# Patient Record
Sex: FEMALE | Race: BLACK OR AFRICAN AMERICAN | NOT HISPANIC OR LATINO | Employment: FULL TIME | URBAN - METROPOLITAN AREA
[De-identification: names, ages, dates, MRNs, and addresses within clinical notes are randomized per-mention and may not be internally consistent; named-entity substitution may affect disease eponyms.]

---

## 2024-03-27 ENCOUNTER — HOSPITAL ENCOUNTER (OUTPATIENT)
Dept: RADIOLOGY | Facility: EXTERNAL LOCATION | Age: 24
Discharge: HOME | End: 2024-03-27

## 2024-03-27 DIAGNOSIS — R06.02 SOB (SHORTNESS OF BREATH): ICD-10-CM

## 2024-06-25 ENCOUNTER — APPOINTMENT (OUTPATIENT)
Dept: PRIMARY CARE | Facility: CLINIC | Age: 24
End: 2024-06-25
Payer: COMMERCIAL

## 2024-06-25 ENCOUNTER — LAB (OUTPATIENT)
Dept: LAB | Facility: LAB | Age: 24
End: 2024-06-25
Payer: COMMERCIAL

## 2024-06-25 ENCOUNTER — TELEPHONE (OUTPATIENT)
Dept: PRIMARY CARE | Facility: CLINIC | Age: 24
End: 2024-06-25

## 2024-06-25 VITALS
TEMPERATURE: 98.1 F | HEIGHT: 64 IN | SYSTOLIC BLOOD PRESSURE: 102 MMHG | OXYGEN SATURATION: 98 % | DIASTOLIC BLOOD PRESSURE: 70 MMHG | HEART RATE: 67 BPM | RESPIRATION RATE: 16 BRPM | BODY MASS INDEX: 24.75 KG/M2 | WEIGHT: 145 LBS

## 2024-06-25 DIAGNOSIS — Z12.4 CERVICAL CANCER SCREENING: ICD-10-CM

## 2024-06-25 DIAGNOSIS — J45.20 MILD INTERMITTENT ASTHMA, UNSPECIFIED WHETHER COMPLICATED (HHS-HCC): ICD-10-CM

## 2024-06-25 DIAGNOSIS — J18.9 COMMUNITY ACQUIRED PNEUMONIA, UNSPECIFIED LATERALITY: ICD-10-CM

## 2024-06-25 DIAGNOSIS — E87.5 HYPERKALEMIA: Primary | ICD-10-CM

## 2024-06-25 DIAGNOSIS — N92.6 IRREGULAR MENSES: ICD-10-CM

## 2024-06-25 DIAGNOSIS — Z00.00 HEALTHCARE MAINTENANCE: Primary | ICD-10-CM

## 2024-06-25 DIAGNOSIS — Z00.00 HEALTHCARE MAINTENANCE: ICD-10-CM

## 2024-06-25 LAB
ALBUMIN SERPL BCP-MCNC: 4.3 G/DL (ref 3.4–5)
ALP SERPL-CCNC: 46 U/L (ref 33–110)
ALT SERPL W P-5'-P-CCNC: 20 U/L (ref 7–45)
ANION GAP SERPL CALC-SCNC: 12 MMOL/L (ref 10–20)
AST SERPL W P-5'-P-CCNC: 15 U/L (ref 9–39)
BASOPHILS # BLD AUTO: 0.04 X10*3/UL (ref 0–0.1)
BASOPHILS NFR BLD AUTO: 0.6 %
BILIRUB SERPL-MCNC: 0.5 MG/DL (ref 0–1.2)
BUN SERPL-MCNC: 11 MG/DL (ref 6–23)
CALCIUM SERPL-MCNC: 9.5 MG/DL (ref 8.6–10.3)
CHLORIDE SERPL-SCNC: 107 MMOL/L (ref 98–107)
CO2 SERPL-SCNC: 28 MMOL/L (ref 21–32)
CREAT SERPL-MCNC: 0.81 MG/DL (ref 0.5–1.05)
EGFRCR SERPLBLD CKD-EPI 2021: >90 ML/MIN/1.73M*2
EOSINOPHIL # BLD AUTO: 0.48 X10*3/UL (ref 0–0.7)
EOSINOPHIL NFR BLD AUTO: 7.5 %
ERYTHROCYTE [DISTWIDTH] IN BLOOD BY AUTOMATED COUNT: 13.7 % (ref 11.5–14.5)
GLUCOSE SERPL-MCNC: 83 MG/DL (ref 74–99)
HCT VFR BLD AUTO: 34.8 % (ref 36–46)
HGB BLD-MCNC: 11.6 G/DL (ref 12–16)
IMM GRANULOCYTES # BLD AUTO: 0.01 X10*3/UL (ref 0–0.7)
IMM GRANULOCYTES NFR BLD AUTO: 0.2 % (ref 0–0.9)
LYMPHOCYTES # BLD AUTO: 1.9 X10*3/UL (ref 1.2–4.8)
LYMPHOCYTES NFR BLD AUTO: 29.5 %
MCH RBC QN AUTO: 29 PG (ref 26–34)
MCHC RBC AUTO-ENTMCNC: 33.3 G/DL (ref 32–36)
MCV RBC AUTO: 87 FL (ref 80–100)
MONOCYTES # BLD AUTO: 0.51 X10*3/UL (ref 0.1–1)
MONOCYTES NFR BLD AUTO: 7.9 %
NEUTROPHILS # BLD AUTO: 3.5 X10*3/UL (ref 1.2–7.7)
NEUTROPHILS NFR BLD AUTO: 54.3 %
NRBC BLD-RTO: 0 /100 WBCS (ref 0–0)
PLATELET # BLD AUTO: 379 X10*3/UL (ref 150–450)
POC APPEARANCE, URINE: CLEAR
POC BILIRUBIN, URINE: NEGATIVE
POC BLOOD, URINE: NEGATIVE
POC COLOR, URINE: YELLOW
POC GLUCOSE, URINE: NEGATIVE MG/DL
POC KETONES, URINE: NEGATIVE MG/DL
POC LEUKOCYTES, URINE: NEGATIVE
POC NITRITE,URINE: NEGATIVE
POC PH, URINE: 6 PH
POC PROTEIN, URINE: NEGATIVE MG/DL
POC SPECIFIC GRAVITY, URINE: >=1.03
POC UROBILINOGEN, URINE: 0.2 EU/DL
POTASSIUM SERPL-SCNC: 5.7 MMOL/L (ref 3.5–5.3)
PROT SERPL-MCNC: 7 G/DL (ref 6.4–8.2)
RBC # BLD AUTO: 4 X10*6/UL (ref 4–5.2)
SODIUM SERPL-SCNC: 141 MMOL/L (ref 136–145)
T4 FREE SERPL-MCNC: 0.81 NG/DL (ref 0.61–1.12)
TSH SERPL-ACNC: 1.45 MIU/L (ref 0.44–3.98)
WBC # BLD AUTO: 6.4 X10*3/UL (ref 4.4–11.3)

## 2024-06-25 PROCEDURE — 90471 IMMUNIZATION ADMIN: CPT | Performed by: FAMILY MEDICINE

## 2024-06-25 PROCEDURE — 81002 URINALYSIS NONAUTO W/O SCOPE: CPT | Performed by: FAMILY MEDICINE

## 2024-06-25 PROCEDURE — 80053 COMPREHEN METABOLIC PANEL: CPT

## 2024-06-25 PROCEDURE — 84443 ASSAY THYROID STIM HORMONE: CPT

## 2024-06-25 PROCEDURE — 99395 PREV VISIT EST AGE 18-39: CPT | Performed by: FAMILY MEDICINE

## 2024-06-25 PROCEDURE — 85025 COMPLETE CBC W/AUTO DIFF WBC: CPT

## 2024-06-25 PROCEDURE — 90715 TDAP VACCINE 7 YRS/> IM: CPT | Performed by: FAMILY MEDICINE

## 2024-06-25 PROCEDURE — 36415 COLL VENOUS BLD VENIPUNCTURE: CPT

## 2024-06-25 PROCEDURE — 99214 OFFICE O/P EST MOD 30 MIN: CPT | Performed by: FAMILY MEDICINE

## 2024-06-25 PROCEDURE — 84439 ASSAY OF FREE THYROXINE: CPT

## 2024-06-25 RX ORDER — ALBUTEROL SULFATE 90 UG/1
AEROSOL, METERED RESPIRATORY (INHALATION)
COMMUNITY

## 2024-06-25 RX ORDER — ETONOGESTREL 68 MG/1
1 IMPLANT SUBCUTANEOUS ONCE
COMMUNITY

## 2024-06-25 RX ORDER — ALBUTEROL SULFATE 0.83 MG/ML
SOLUTION RESPIRATORY (INHALATION)
COMMUNITY

## 2024-06-25 ASSESSMENT — PATIENT HEALTH QUESTIONNAIRE - PHQ9
1. LITTLE INTEREST OR PLEASURE IN DOING THINGS: NOT AT ALL
SUM OF ALL RESPONSES TO PHQ9 QUESTIONS 1 AND 2: 0
2. FEELING DOWN, DEPRESSED OR HOPELESS: NOT AT ALL

## 2024-06-25 NOTE — PROGRESS NOTES
"Subjective   Patient ID: Forest Campos is a 24 y.o. female who presents for Annual Exam (Notes irregular menstrual cycles. LMP last week of may and then again 6/15. ).    She had pneumonia and was told to follow up here.  She was in the ER 6/1.  She was given antibiotics and steroids.  She has a history of asthma.  She just uses albuterol and uses that usually about once a day.     She started taking ozempic in November but stopped it in June.         Dentist and Eye Doctor appointments: Due for dentist  Immunizations:due for Tdap  Diet:eats healthy was taking ozempic but off now  Exercise: Tries to go to the gym one day a week  Supplements: None  Menstrual Cycles: has nexaplanon so irregular menses  Sexually Active: yes, male, no std concerns  Pregnancy History:G0  Cancer Screening:    Cervical: GYN - due in 2025   Breast: N/A   Colon: N/A   Skin:wears sunscreen   DEXA:N/A          Review of Systems    Objective   /70   Pulse 67   Temp 36.7 °C (98.1 °F)   Resp 16   Ht 1.626 m (5' 4\")   Wt 65.8 kg (145 lb)   LMP 06/15/2024 Comment: last pap 9/2022 [facility] gyn  SpO2 98%   BMI 24.89 kg/m²     Physical Exam  Constitutional:       General: She is not in acute distress.     Appearance: She is well-developed. She is not diaphoretic.   HENT:      Head: Normocephalic and atraumatic.      Right Ear: Tympanic membrane normal.      Left Ear: Tympanic membrane normal.      Nose: Nose normal.      Mouth/Throat:      Mouth: Mucous membranes are moist.   Eyes:      General: No scleral icterus.     Pupils: Pupils are equal, round, and reactive to light.   Neck:      Thyroid: No thyromegaly.      Vascular: No JVD.   Cardiovascular:      Rate and Rhythm: Normal rate and regular rhythm.      Heart sounds: Normal heart sounds. No murmur heard.     No friction rub. No gallop.   Pulmonary:      Effort: Pulmonary effort is normal. No respiratory distress.      Breath sounds: Normal breath sounds. No wheezing or rales. "   Chest:      Chest wall: No tenderness.   Breasts:     Right: Normal.      Left: Normal.   Abdominal:      General: Bowel sounds are normal. There is no distension.      Palpations: Abdomen is soft. There is no mass.      Tenderness: There is no abdominal tenderness. There is no rebound.   Musculoskeletal:         General: Normal range of motion.      Cervical back: Normal range of motion and neck supple.   Lymphadenopathy:      Cervical: No cervical adenopathy.   Skin:     General: Skin is warm and dry.   Neurological:      General: No focal deficit present.      Mental Status: She is alert and oriented to person, place, and time.      Deep Tendon Reflexes: Reflexes normal.   Psychiatric:         Mood and Affect: Mood normal.         Behavior: Behavior normal.         Assessment/Plan   Diagnoses and all orders for this visit:  Healthcare maintenance  -     POCT UA (nonautomated) manually resulted  -     CBC and Auto Differential; Future  -     Comprehensive Metabolic Panel; Future  Mild intermittent asthma, unspecified whether complicated ([facility])  -     Complete Pulmonary Function Test Pre/Post Bronchodialator (Spirometry Pre/Post/DLCO/Lung Volumes); Future  Community acquired pneumonia, unspecified laterality  Cervical cancer screening  Comments:  2022 - wnl, gyn @ [facility]  Irregular menses  -     Thyroid Stimulating Hormone; Future  -     Thyroxine, Free; Future  Other orders  -     Tdap vaccine, age 7 years and older  (BOOSTRIX)

## 2024-06-25 NOTE — PATIENT INSTRUCTIONS
Today we performed your Annual Physical Exam.  Your preventative health care, labs and vaccinations have been reviewed and are up to date.      You were given a Tdap vaccine today and this is good for 10 years.    For your irregular periods I have advised that you get labs and follow up with your GYN for your nexplanon    For your recent pneumonia I have ordered a new CXR    For your history of intermittent asthma we had a long discussion of not smoking marijuana and we will also do new PFT's     Follow up when results received.    Follow up in 1 year for your Complete Physical Exam

## 2024-06-26 ENCOUNTER — TELEPHONE (OUTPATIENT)
Dept: PRIMARY CARE | Facility: CLINIC | Age: 24
End: 2024-06-26
Payer: COMMERCIAL

## 2024-06-26 NOTE — RESULT ENCOUNTER NOTE
Labs show  potassium is slightly elevated. This can sometimes happen accidentally depending on how the tube of blood is processed.  So let’s recheck this in two weeks please I have ordered it and you can stop in at the lab at anytime.

## 2024-07-22 ENCOUNTER — LAB REQUISITION (OUTPATIENT)
Dept: LAB | Facility: HOSPITAL | Age: 24
End: 2024-07-22
Payer: COMMERCIAL

## 2024-07-22 DIAGNOSIS — R30.0 DYSURIA: ICD-10-CM

## 2024-07-22 DIAGNOSIS — R35.0 FREQUENCY OF MICTURITION: ICD-10-CM

## 2024-07-22 PROCEDURE — 87661 TRICHOMONAS VAGINALIS AMPLIF: CPT

## 2024-07-22 PROCEDURE — 87086 URINE CULTURE/COLONY COUNT: CPT

## 2024-07-22 PROCEDURE — 87491 CHLMYD TRACH DNA AMP PROBE: CPT

## 2024-07-22 PROCEDURE — 87591 N.GONORRHOEAE DNA AMP PROB: CPT

## 2024-07-22 PROCEDURE — 87186 SC STD MICRODIL/AGAR DIL: CPT

## 2024-07-23 LAB
C TRACH RRNA SPEC QL NAA+PROBE: NEGATIVE
N GONORRHOEA DNA SPEC QL PROBE+SIG AMP: NEGATIVE
T VAGINALIS RRNA SPEC QL NAA+PROBE: NEGATIVE

## 2024-07-25 LAB — BACTERIA UR CULT: ABNORMAL

## 2024-08-08 ENCOUNTER — APPOINTMENT (OUTPATIENT)
Dept: RESPIRATORY THERAPY | Facility: HOSPITAL | Age: 24
End: 2024-08-08
Payer: COMMERCIAL

## 2025-01-15 ENCOUNTER — HOSPITAL ENCOUNTER (OUTPATIENT)
Dept: RESPIRATORY THERAPY | Facility: HOSPITAL | Age: 25
Discharge: HOME | End: 2025-01-15
Payer: COMMERCIAL

## 2025-01-15 DIAGNOSIS — J45.20 MILD INTERMITTENT ASTHMA, UNSPECIFIED WHETHER COMPLICATED (HHS-HCC): ICD-10-CM

## 2025-01-15 LAB
MGC ASCENT PFT - FEV1 - PRE: 3.28
MGC ASCENT PFT - FEV1 - PREDICTED: 3.19
MGC ASCENT PFT - FVC - PRE: 3.8
MGC ASCENT PFT - FVC - PREDICTED: 3.69

## 2025-01-15 PROCEDURE — 94726 PLETHYSMOGRAPHY LUNG VOLUMES: CPT

## 2025-01-20 ENCOUNTER — APPOINTMENT (OUTPATIENT)
Facility: CLINIC | Age: 25
End: 2025-01-20
Payer: COMMERCIAL

## 2025-01-20 VITALS
WEIGHT: 138 LBS | SYSTOLIC BLOOD PRESSURE: 104 MMHG | HEART RATE: 75 BPM | OXYGEN SATURATION: 97 % | TEMPERATURE: 98.5 F | RESPIRATION RATE: 16 BRPM | DIASTOLIC BLOOD PRESSURE: 72 MMHG | BODY MASS INDEX: 23.69 KG/M2

## 2025-01-20 DIAGNOSIS — R06.2 WHEEZE: ICD-10-CM

## 2025-01-20 DIAGNOSIS — J45.909 REACTIVE AIRWAY DISEASE WITHOUT COMPLICATION, UNSPECIFIED ASTHMA SEVERITY, UNSPECIFIED WHETHER PERSISTENT (HHS-HCC): Primary | ICD-10-CM

## 2025-01-20 PROCEDURE — 99214 OFFICE O/P EST MOD 30 MIN: CPT | Performed by: FAMILY MEDICINE

## 2025-01-20 PROCEDURE — 1036F TOBACCO NON-USER: CPT | Performed by: FAMILY MEDICINE

## 2025-01-20 RX ORDER — FLUTICASONE PROPIONATE 110 UG/1
1 AEROSOL, METERED RESPIRATORY (INHALATION)
Qty: 12 G | Refills: 3 | Status: SHIPPED | OUTPATIENT
Start: 2025-01-20 | End: 2025-01-24 | Stop reason: ALTCHOICE

## 2025-01-20 RX ORDER — ALBUTEROL SULFATE 90 UG/1
2 INHALANT RESPIRATORY (INHALATION) EVERY 4 HOURS PRN
Qty: 18 G | Refills: 3 | Status: SHIPPED | OUTPATIENT
Start: 2025-01-20

## 2025-01-20 NOTE — PROGRESS NOTES
Current Outpatient Medications   Medication Sig Dispense Refill    albuterol 2.5 mg /3 mL (0.083 %) nebulizer solution USE 3 ML VIA NEBULIZER EVERY 6 HOURS AS NEEDED FOR WHEEZING OR SHORTNESS OF BREATH      etonogestrel-eluting contraceptive (Nexplanon) 68 mg implant implant 1 each by subdermal route 1 time.      albuterol 90 mcg/actuation inhaler Inhale 2 puffs every 4 hours if needed for wheezing or other (30 minutes prior to exercise). 18 g 3    fluticasone (Flovent) 110 mcg/actuation inhaler Inhale 1 puff 2 times a day. Rinse mouth with water after use to reduce aftertaste and incidence of candidiasis. Do not swallow. 12 g 3     No current facility-administered medications for this visit.   Subjective   Patient ID: Forest Campos is a 24 y.o. female who presents for Results (Discuss PFT).    She was having trouble breathing she states that mostly it was when she was smoking. She has been trying not to smoke this month.  This is referral to marijuana.  Her resolution has been not smoking.  After she works out she has trouble breathing, also  in the mornings and when it's cold.  She had pneumonia last spring and after that it made it worse breathing. Her inhaler does help her but she has to hit it multiple times throughout the day.  She just wokred out and took the inhaler prior to exercise and now feels like she needs it again.           Review of Systems    Objective   /72   Pulse 75   Temp 36.9 °C (98.5 °F)   Resp 16   Wt 62.6 kg (138 lb)   SpO2 97%   BMI 23.69 kg/m²     Physical Exam  Constitutional:       Appearance: Normal appearance.   HENT:      Head: Normocephalic.   Eyes:      Pupils: Pupils are equal, round, and reactive to light.   Cardiovascular:      Rate and Rhythm: Normal rate and regular rhythm.   Pulmonary:      Effort: Pulmonary effort is normal.      Breath sounds: Wheezing (scattered throughout) present.   Musculoskeletal:      Cervical back: Normal range of motion.   Skin:      General: Skin is warm.   Neurological:      General: No focal deficit present.      Mental Status: She is alert and oriented to person, place, and time.   Psychiatric:         Mood and Affect: Mood normal.         Assessment/Plan   Diagnoses and all orders for this visit:  Reactive airway disease without complication, unspecified asthma severity, unspecified whether persistent ([facility])  -     albuterol 90 mcg/actuation inhaler; Inhale 2 puffs every 4 hours if needed for wheezing or other (30 minutes prior to exercise).  -     Referral to Pulmonology; Future  -     Aerochamber Spacer Device  -     fluticasone (Flovent) 110 mcg/actuation inhaler; Inhale 1 puff 2 times a day. Rinse mouth with water after use to reduce aftertaste and incidence of candidiasis. Do not swallow.  Wheeze

## 2025-01-20 NOTE — PATIENT INSTRUCTIONS
Today we have addressed your reactive airway disease.  Your PFT's were normal but did identify air trapping. You are wheezing on your exam.  I do want to see if you would benefit from inhaled steroids and so we will start you on flovent and use the albuterol prn.  I also want you to have a pulmonology consult in order to determine if we are on the best plan for you given your normal PFT's.  Lastly please use a spacer divice with your albuterol.

## 2025-01-21 ENCOUNTER — APPOINTMENT (OUTPATIENT)
Facility: CLINIC | Age: 25
End: 2025-01-21
Payer: COMMERCIAL

## 2025-01-24 DIAGNOSIS — J45.909 REACTIVE AIRWAY DISEASE WITHOUT COMPLICATION, UNSPECIFIED ASTHMA SEVERITY, UNSPECIFIED WHETHER PERSISTENT (HHS-HCC): Primary | ICD-10-CM

## 2025-01-24 RX ORDER — FLUTICASONE FUROATE 100 UG/1
1 POWDER RESPIRATORY (INHALATION) DAILY
Qty: 1 EACH | Refills: 11 | Status: SHIPPED | OUTPATIENT
Start: 2025-01-24 | End: 2026-01-24

## 2025-01-24 NOTE — PROGRESS NOTES
Current Outpatient Medications   Medication Sig Dispense Refill    albuterol 2.5 mg /3 mL (0.083 %) nebulizer solution USE 3 ML VIA NEBULIZER EVERY 6 HOURS AS NEEDED FOR WHEEZING OR SHORTNESS OF BREATH      albuterol 90 mcg/actuation inhaler Inhale 2 puffs every 4 hours if needed for wheezing or other (30 minutes prior to exercise). 18 g 3    etonogestrel-eluting contraceptive (Nexplanon) 68 mg implant implant 1 each by subdermal route 1 time.      fluticasone (Flovent) 110 mcg/actuation inhaler Inhale 1 puff 2 times a day. Rinse mouth with water after use to reduce aftertaste and incidence of candidiasis. Do not swallow. 12 g 3     No current facility-administered medications for this visit.

## 2025-03-20 ENCOUNTER — OFFICE VISIT (OUTPATIENT)
Dept: PULMONOLOGY | Facility: HOSPITAL | Age: 25
End: 2025-03-20
Payer: COMMERCIAL

## 2025-03-20 VITALS
DIASTOLIC BLOOD PRESSURE: 75 MMHG | WEIGHT: 143.6 LBS | OXYGEN SATURATION: 98 % | TEMPERATURE: 97 F | SYSTOLIC BLOOD PRESSURE: 114 MMHG | BODY MASS INDEX: 24.65 KG/M2 | HEART RATE: 69 BPM

## 2025-03-20 DIAGNOSIS — J45.40 MODERATE PERSISTENT ASTHMA WITHOUT COMPLICATION (HHS-HCC): Primary | ICD-10-CM

## 2025-03-20 PROCEDURE — 1036F TOBACCO NON-USER: CPT | Performed by: NURSE PRACTITIONER

## 2025-03-20 PROCEDURE — 99215 OFFICE O/P EST HI 40 MIN: CPT | Performed by: NURSE PRACTITIONER

## 2025-03-20 PROCEDURE — 99205 OFFICE O/P NEW HI 60 MIN: CPT | Performed by: NURSE PRACTITIONER

## 2025-03-20 RX ORDER — BUDESONIDE AND FORMOTEROL FUMARATE DIHYDRATE 80; 4.5 UG/1; UG/1
2 AEROSOL RESPIRATORY (INHALATION)
Qty: 10.2 G | Refills: 5 | Status: SHIPPED | OUTPATIENT
Start: 2025-03-20

## 2025-03-20 ASSESSMENT — ENCOUNTER SYMPTOMS
BRUISES/BLEEDS EASILY: 0
VOMITING: 0
BACK PAIN: 0
SHORTNESS OF BREATH: 1
FATIGUE: 0
ABDOMINAL PAIN: 0
PALPITATIONS: 0
HEADACHES: 0
AGITATION: 0
MYALGIAS: 0
APPETITE CHANGE: 0
TROUBLE SWALLOWING: 0
WHEEZING: 1
UNEXPECTED WEIGHT CHANGE: 0
ARTHRALGIAS: 0
CHILLS: 0
ACTIVITY CHANGE: 0
SORE THROAT: 0
SLEEP DISTURBANCE: 0
EYE REDNESS: 0
NAUSEA: 0
SINUS PRESSURE: 0
SINUS PAIN: 0
COUGH: 1
FEVER: 0
TREMORS: 0
VOICE CHANGE: 0
DIZZINESS: 0
JOINT SWELLING: 0
RHINORRHEA: 0
EYE ITCHING: 0
DIARRHEA: 0
NERVOUS/ANXIOUS: 1
STRIDOR: 0
WEAKNESS: 0
ROS GI COMMENTS: DENIES ACID REFLUX
CHEST TIGHTNESS: 1

## 2025-03-20 ASSESSMENT — LIFESTYLE VARIABLES
SKIP TO QUESTIONS 9-10: 1
HOW OFTEN DO YOU HAVE A DRINK CONTAINING ALCOHOL: NEVER
AUDIT-C TOTAL SCORE: 0
HOW MANY STANDARD DRINKS CONTAINING ALCOHOL DO YOU HAVE ON A TYPICAL DAY: PATIENT DOES NOT DRINK
HOW OFTEN DO YOU HAVE SIX OR MORE DRINKS ON ONE OCCASION: NEVER

## 2025-03-20 ASSESSMENT — PAIN SCALES - GENERAL: PAINLEVEL_OUTOF10: 0-NO PAIN

## 2025-03-20 NOTE — PATIENT INSTRUCTIONS
"Today we discussed your pulmonary history, symptoms and plan moving forward.    -Start Symbicort 80 (budesonide + formoterol); 2 inhalations twice a day. Rinse mouth after use to avoid thrush. This is your long acting daily maintenance inhaler. Please contact my nurse if you have any difficulty getting this prescription.  -Continue Albuterol Inhaler; 2 puffs every 4-6 hours as needed for shortness of breath. You can also take this 10-15 minutes prior to exertional activity to help \"prime\" your lungs.  -Please monitor frequency of need of the albuterol inhaler.  If you find yourself needing to use this most days of the week/multiple times a day; please give one of my nurses to call and we will look to get you started on a daily maintenance inhaler as we discussed today.  -If you feel like you are starting to have an exacerbation of your symptoms (increased cough with purulent mucus, shortness of breath, overall not feeling well, etc); please contact the pulmonary nurse and I will be more than happy to call you and a course of prednisone/antibiotics if indicated.    Thank you for visiting the pulmonary clinic today! It was a pleasure to participate in your care.  Please return to clinic 3 months or sooner if needed.    Osvaldo Taylor, CNP  My Office Number: [phone]   CT Scheduling: [phone]  PFT (Pulmonary Function Test) Scheduling: [phone]  Follow Up Visit Scheduling: [phone]  My Nurse: Ana Manning RN & Sue Galvez RN    To reach the nurse, Ana Manning RN, please call [phone]). If unable to reach Ana, please contact Sue Galvez RN at ([phone]). Both nurses have secure voicemail's if needing to leave a message.    **For urgent needs such as medication issues/refills, active sick symptoms or medical concerns please call the office directly and speak to the pulmonary nurse. For nonurgent messages please use [employer]. Thank you.**    "

## 2025-03-20 NOTE — PROGRESS NOTES
Patient: Forest Campos    MRN: [id number]  : 2000 -- AGE: 24 y.o.    Provider: NURA Nichole     Location [facility]   Service Date: 3/20/2025       Department of Medicine  Division of Pulmonary, Critical Care, and Sleep Medicine       [facility]  New Visit Note    HISTORY OF PRESENT ILLNESS     The patient's referring provider is: Mattie Heath DO    PCP: Dr. Mattie Heath     HISTORY OF PRESENT ILLNESS   Forest Campos is a 24 y.o. female who presents to [facility] for an evaluation with concerns of No chief complaint on file.. I have independently interviewed and examined the patient in the office and reviewed available records.    Current History  Patient presents to pulmonary clinic today for new patient establishment; concern of asthma; referred by the PCP.  Patient most recently completed PFT study on 1/15/2025 which did not show any obstruction or restriction, no bronchodilator was performed there was mild to moderate air trapping on lung volumes and a normal diffusion capacity.  Upon chart review it appears that her eosinophils have been historically elevated; most recently on 2024; 480.  It appears that the primary care prescribed her Arnuity 100 on 2025.    She has never been seen by a pulmonologist previously. She had pneumonia a few days after she was born and has had pneumonias a few times subsequently throughout her life. She was diagnosed with asthma by her pediatrician as a young child. She was most recently diagnosed with pneumonia twice last spring in which for one of the episodes she was evaluated in the ED and discharged home with antibiotics, steroids, and PCP follow-up. She had COVID-19 two years ago.    She reports her asthma symptoms have been worse over the past year. She states that previously, aside from the few episodes of pneumonia, her asthma was  more controlled. She tends to have symptom flares at least once daily. She reports wheezing is the most prominent symptom she has. The wheezing worsens with exposure to cleaning products, cold air, smoke, and exercise. She reports shortness of breath and chest tightness on exertion and the sensation of feeling like she is breathing through a straw. She reports she occasionally coughs up yellow/green mucus. She states sometimes she feels the mucus originates from her sinuses and she has to blow her nose, but she denies any regular sinus drainage issues. She also sometimes notices coughing at night that wakes her up. She denies shortness of breath at rest, allergy issues, or GERD.    She has an albuterol HFA and nebulized albuterol. She reports the albuterol inhaler resolves symptoms after she takes 4 puffs. She uses her albuterol HFA about 2-3 times daily; she sometimes uses it first thing in the morning, and then if she is walking longer distances or exercising. She tends to only use the nebulized albuterol when she is sick. She never picked up the Arnuity inhaler from the pharmacy because it was too expensive. She has tried a friend's Symbicort recently and reports it helped significantly.    She reports she smoked marijuana for about the past 10 years but quit the first of this year and is hopeful to abstain indefinitely. She has never smoked cigarettes or vaped.     Currently, patient reports symptoms of:   []None  [x]SOB at Rest               []MENESES              [x]Cough: Productive  (Green/yellow sputum, no hemoptysis)        [x]Wheezing               [x]Chest Tightness (on exertion)  []Orthopnea   []Lower Leg Edema   []Chest Pain  []Palpitations   []GERD  []Rhinitis  []Throat Clearing  []Voice Hoarseness    Prior Pulmonary History:   []None  []Born Premature  [x]Pulmonary Issues in Childhood  []Pulmonary Focused Hospitalizations  []Hx of Home Oxygen Use    Prior/Current Inhaler History:   []None                              [x]ICS: Arnuity Ellipta       [x]LEVY: Albuterol HFA and Albuterol Nebulizer   []ICS/LABA:       []LEVY/WILMAN:      []ICS/LABA/LAMA:   []LABA:       []Other:   []LAMA:   []LABA/LAMA:     Sleep History:  [x]None     []Witnessed Apneic Events/Abnormal Breathing Patterns []Dozing Off Easily  []Completed a Sleep Study in the Past []Waking Up Choking/Gasping    []Daytime Napping  []Has Been Diagnosed with TAMARA  []Morning Headaches     []Wears CPAP/BiPAP  []Snoring     []Excessive Daytime Fatigue     []Wears Nocturnal Oxygen    ACT Today: 11    Prior Notes & History       REVIEW OF SYSTEMS     REVIEW OF SYSTEMS  Review of Systems   Constitutional:  Negative for activity change, appetite change, chills, fatigue, fever and unexpected weight change.   HENT:  Negative for congestion, postnasal drip, rhinorrhea, sinus pressure, sinus pain, sneezing, sore throat, trouble swallowing and voice change.         Denies throat clearing   Eyes:  Negative for redness and itching.   Respiratory:  Positive for cough, chest tightness (on exertion), shortness of breath (on exertion) and wheezing (on exertion). Negative for stridor.    Cardiovascular:  Negative for chest pain, palpitations and leg swelling.        Denies orthopnea   Gastrointestinal:  Negative for abdominal pain, diarrhea, nausea and vomiting.        Denies acid reflux   Musculoskeletal:  Negative for arthralgias, back pain, joint swelling and myalgias.   Skin:  Negative for rash.   Allergic/Immunologic: Negative for immunocompromised state.   Neurological:  Negative for dizziness, tremors, weakness and headaches.   Hematological:  Does not bruise/bleed easily.   Psychiatric/Behavioral:  Negative for agitation and sleep disturbance. The patient is nervous/anxious.         Denies depression   All other systems reviewed and are negative.      ALLERGIES & MEDICATIONS     ALLERGIES  No Known Allergies    MEDICATIONS  Current Outpatient Medications   Medication Sig  Dispense Refill    albuterol 2.5 mg /3 mL (0.083 %) nebulizer solution USE 3 ML VIA NEBULIZER EVERY 6 HOURS AS NEEDED FOR WHEEZING OR SHORTNESS OF BREATH      albuterol 90 mcg/actuation inhaler Inhale 2 puffs every 4 hours if needed for wheezing or other (30 minutes prior to exercise). 18 g 3    etonogestrel-eluting contraceptive (Nexplanon) 68 mg implant implant 1 each by subdermal route 1 time.      fluticasone furoate (Arnuity Ellipta) 100 mcg/actuation inhaler Inhale 1 puff once daily. Rinse mouth with water after use to reduce aftertaste and incidence of candidiasis. Do not swallow. 1 each 11     No current facility-administered medications for this visit.       PAST HISTORY     PAST MEDICAL HISTORY  She  has no past medical history on file.    PAST SURGICAL HISTORY  Past Surgical History:   Procedure Laterality Date    HAND SURGERY      tendon/ligament x2    WISDOM TOOTH EXTRACTION         IMMUNIZATION HISTORY  Immunization History   Administered Date(s) Administered    DTaP vaccine, pediatric  (INFANRIX) 2000, [birth year], 05/24/2001, 06/03/2004    HPV, Quadrivalent 10/01/2010, 07/30/2012, 07/08/2013    Hepatitis A vaccine, pediatric/adolescent (HAVRIX, VAQTA) 08/21/2014, 07/30/2015    Hepatitis B vaccine, 19 yrs and under (RECOMBIVAX, ENGERIX) 2000, 2000, 01/29/2000    Hib (HbOC) 2000, 2000, 01/29/2000, 05/24/2001    Influenza, Unspecified 10/01/2010    Estefania SARS-CoV-2 Vaccination 04/08/2021, 12/23/2021    MMR vaccine, subcutaneous (MMR II) 08/07/2000, 06/03/2004    Meningococcal ACWY-D (Menactra) 4-valent conjugate vaccine 07/15/2016    Meningococcal MCV4, Unspecified 10/01/2010    Poliovirus vaccine, subcutaneous (IPOL) 2000, 2000, 06/03/2004, 09/16/2004    Tdap vaccine, age 7 year and older (BOOSTRIX, ADACEL) 07/14/2009, 06/25/2024    Varicella vaccine, subcutaneous (VARIVAX) 06/03/2004, 08/26/2008       SOCIAL HISTORY  She  reports that she has  never smoked. She has never used smokeless tobacco. She reports that she does not currently use drugs after having used the following drugs: Marijuana. No history on file for alcohol use.   Father - childhood asthma  Brother - childhood asthma    No current alcohol use.  No current marijuana use.  Never smoked cigarettes or vaping.    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     PREVIOUS WEIGHTS:  Wt Readings from Last 3 Encounters:   01/20/25 62.6 kg (138 lb)   07/22/24 65.8 kg (145 lb 1 oz)   06/25/24 65.8 kg (145 lb)       Physical Exam  Vitals reviewed.   Constitutional:       General: She is not in acute distress.     Appearance: Normal appearance. She is not ill-appearing or toxic-appearing.   HENT:      Head: Normocephalic.      Nose: No rhinorrhea.   Cardiovascular:      Rate and Rhythm: Normal rate and regular rhythm.      Heart sounds: Normal heart sounds.   Pulmonary:      Effort: Pulmonary effort is normal. No respiratory distress.      Breath sounds: No stridor. Wheezing (inspiratory, throughout) present. No rhonchi or rales.   Abdominal:      General: Abdomen is flat.   Musculoskeletal:         General: Normal range of motion.      Right lower leg: No edema.      Left lower leg: No edema.   Skin:     General: Skin is warm and dry.      Nails: There is no clubbing.   Neurological:      General: No focal deficit present.      Mental Status: She is alert and oriented to person, place, and time.   Psychiatric:         Mood and Affect: Mood normal.          "Behavior: Behavior normal.         Judgment: Judgment normal.         RESULTS/DATA     Pulmonary Function Test Results   PFT   1/15/25: FEV1/FVC: 86, FEV1: 3.28 (102%), FVC: 3.80 (103%), no BD performed, [id number]: 104%, T.32 (102%), RV/T%, DLCO: 103%     Chest Radiograph   CXR   24: (Read Only: [facility]) IMPRESSION: Relatively shallow inspiration.  Patchy opacity is seen    posterior the heart and overlying the spine on the lateral view. Consider pneumonia.  Associated bronchiectasis.   3/27/24: IMPRESSION: No focal infiltrate or pneumothorax.     Chest CT Scan     No results found for this or any previous visit from the past 365 days.    Echocardiogram & Cardiac Studies     No results found for this or any previous visit from the past 365 days.       Labwork & Pathology   Complete Blood Count  Lab Results   Component Value Date    WBC 6.4 2024    HGB 11.6 (L) 2024    HCT 34.8 (L) 2024    MCV 87 2024     2024     Peripheral Eosinophil Count:   Eosinophils Absolute   Date Value   2024 0.48 x10*3/uL   2021 0.49 x10E9/L     Immunocap IgE  No results found for: \"ICIGE\"    Bronchoscopy & Pathology/Cultures       ASSESSMENT/PLAN     Ms. Campos is a 24 y.o. female; was referred to the [facility] for evaluation of No chief complaint on file.    Problem List and Orders  Diagnoses and all orders for this visit:  Moderate persistent asthma without complication ([facility])  -     Referral to Pulmonology  -     budesonide-formoterol (Symbicort) 80-4.5 mcg/actuation inhaler; Inhale 2 puffs 2 times a day. Rinse mouth with water after use to reduce aftertaste and incidence of candidiasis. Do not swallow.      Assessment and Plan / Recommendations:  Asthma: Diagnosed as a child with history of pneumonias throughout her life, most recently had pneumonia last year. No hospitalization history. PFT 2025 without obstruction or " restriction, mild to moderate air trapping. Reports at least daily symptoms of wheezing, cough, and shortness of breath on exertion. Currently using albuterol HFA 2-3 times per day. Has only tried a friends' Symbicort once with significant relief.  - Start Symbicort 80-4.5 mcg 2 puffs twice daily, rinse mouth out after use.  - Continue albuterol HFA or nebulized albuterol every 4-6 hours as needed for shortness of breath or wheezing.  - Continue to abstain from smoking and avoid triggers.    RTC 3 months    Osvaldo Taylor CNP  [facility] Nurse Practitioner    *This note was dictated using DRAGON speech recognition software and was corrected for spelling or grammatical errors, but despite proofreading several typographical errors might be present that might affect the meaning of the content.*

## 2025-06-26 ENCOUNTER — APPOINTMENT (OUTPATIENT)
Dept: PULMONOLOGY | Facility: HOSPITAL | Age: 25
End: 2025-06-26
Payer: COMMERCIAL